# Patient Record
Sex: MALE | Race: WHITE | NOT HISPANIC OR LATINO | URBAN - METROPOLITAN AREA
[De-identification: names, ages, dates, MRNs, and addresses within clinical notes are randomized per-mention and may not be internally consistent; named-entity substitution may affect disease eponyms.]

---

## 2021-11-01 ENCOUNTER — OUT OF OFFICE VISIT (OUTPATIENT)
Dept: URBAN - METROPOLITAN AREA MEDICAL CENTER 35 | Facility: MEDICAL CENTER | Age: 76
End: 2021-11-01
Payer: MEDICARE

## 2021-11-01 DIAGNOSIS — I48.91 A-FIB: ICD-10-CM

## 2021-11-01 DIAGNOSIS — D64.89 ANEMIA DUE TO OTHER CAUSE: ICD-10-CM

## 2021-11-01 DIAGNOSIS — N17.9 ACUTE INJURY OF KIDNEY: ICD-10-CM

## 2021-11-01 DIAGNOSIS — R11.0 NAUSEA: ICD-10-CM

## 2021-11-01 PROCEDURE — 99233 SBSQ HOSP IP/OBS HIGH 50: CPT | Performed by: INTERNAL MEDICINE

## 2021-11-02 ENCOUNTER — OUT OF OFFICE VISIT (OUTPATIENT)
Dept: URBAN - METROPOLITAN AREA MEDICAL CENTER 35 | Facility: MEDICAL CENTER | Age: 76
End: 2021-11-02
Payer: MEDICARE

## 2021-11-02 DIAGNOSIS — N17.9 ACUTE INJURY OF KIDNEY: ICD-10-CM

## 2021-11-02 DIAGNOSIS — D64.89 ANEMIA DUE TO OTHER CAUSE: ICD-10-CM

## 2021-11-02 DIAGNOSIS — I48.91 A-FIB: ICD-10-CM

## 2021-11-02 DIAGNOSIS — R11.0 NAUSEA: ICD-10-CM

## 2021-11-02 PROCEDURE — 99233 SBSQ HOSP IP/OBS HIGH 50: CPT | Performed by: INTERNAL MEDICINE

## 2023-04-10 ENCOUNTER — DASHBOARD ENCOUNTERS (OUTPATIENT)
Age: 78
End: 2023-04-10

## 2023-04-11 ENCOUNTER — OFFICE VISIT (OUTPATIENT)
Dept: URBAN - METROPOLITAN AREA CLINIC 44 | Facility: CLINIC | Age: 78
End: 2023-04-11
Payer: MEDICARE

## 2023-04-11 VITALS
HEIGHT: 70 IN | DIASTOLIC BLOOD PRESSURE: 62 MMHG | WEIGHT: 165 LBS | TEMPERATURE: 97.9 F | HEART RATE: 83 BPM | SYSTOLIC BLOOD PRESSURE: 94 MMHG | BODY MASS INDEX: 23.62 KG/M2

## 2023-04-11 DIAGNOSIS — R11.0 NAUSEA: ICD-10-CM

## 2023-04-11 DIAGNOSIS — R62.7 FAILURE TO THRIVE IN ADULT: ICD-10-CM

## 2023-04-11 PROBLEM — 129588001: Status: ACTIVE | Noted: 2023-04-11

## 2023-04-11 PROCEDURE — 99204 OFFICE O/P NEW MOD 45 MIN: CPT | Performed by: INTERNAL MEDICINE

## 2023-04-11 RX ORDER — ALLOPURINOL 100 MG/1
1 TABLET TABLET ORAL ONCE A DAY
Qty: 90 TABLET | Status: ACTIVE | COMMUNITY

## 2023-04-11 RX ORDER — PANTOPRAZOLE 40 MG/1
1 TABLET TABLET, DELAYED RELEASE ORAL ONCE A DAY
Qty: 90 TABLET | Status: ACTIVE | COMMUNITY

## 2023-04-11 RX ORDER — AMIODARONE HYDROCHLORIDE 200 MG/1
1 TABLET TABLET ORAL ONCE A DAY
Qty: 90 TABLET | Refills: 1 | Status: ACTIVE | COMMUNITY

## 2023-04-11 RX ORDER — FUROSEMIDE 40 MG/1
1 TABLET TABLET ORAL ONCE A DAY
Qty: 30 TABLET | Status: ACTIVE | COMMUNITY

## 2023-04-11 RX ORDER — METOPROLOL TARTRATE 50 MG/1
1 TABLET WITH FOOD TABLET, FILM COATED ORAL TWICE A DAY
Qty: 180 TABLET | Status: ACTIVE | COMMUNITY

## 2023-04-11 RX ORDER — BUMETANIDE 2 MG/1
1 TABLET TABLET ORAL ONCE A DAY
Qty: 30 TABLET | Refills: 0 | Status: ACTIVE | COMMUNITY

## 2023-04-11 NOTE — HPI-TODAY'S VISIT:
78 yo M presents for evaluation of chronic dyspepsia. The patient associates gagging while eating, and nausea. Onset began over the last 2 months. The patient mentions that he is only able to eat a child's size breakfast and then will not eat anything further until late afternoon. He has a history of a colostomy with an indwelling permanent catheter s/p wound vac, paraplegic and history of an aortic dissection followed by Dr. 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 Labs from [place] one month ago revealed Na 129, COS18, CR 3.10, Alb 2.2, AST 74, UA with UTI.  He spend two days as an inpatient and was followed by urology. He had a submucscular fistulotomy with transanal excision anal mass on 3/28/22.

## 2024-08-13 NOTE — PHYSICAL EXAM PSYCH:
Pre-op faxed to [facility] [phone] on 8/13/24 at 2:55pm.    normal mood with appropriate affect

## 2024-09-07 ENCOUNTER — CLAIMS CREATED FROM THE CLAIM WINDOW (OUTPATIENT)
Dept: URBAN - METROPOLITAN AREA MEDICAL CENTER 35 | Facility: MEDICAL CENTER | Age: 79
End: 2024-09-07
Payer: MEDICARE

## 2024-09-07 DIAGNOSIS — D64.89 ANEMIA DUE TO OTHER CAUSE: ICD-10-CM

## 2024-09-07 PROCEDURE — 99254 IP/OBS CNSLTJ NEW/EST MOD 60: CPT | Performed by: INTERNAL MEDICINE

## 2024-09-07 PROCEDURE — 99222 1ST HOSP IP/OBS MODERATE 55: CPT | Performed by: INTERNAL MEDICINE

## 2024-09-08 ENCOUNTER — CLAIMS CREATED FROM THE CLAIM WINDOW (OUTPATIENT)
Dept: URBAN - METROPOLITAN AREA MEDICAL CENTER 35 | Facility: MEDICAL CENTER | Age: 79
End: 2024-09-08
Payer: MEDICARE

## 2024-09-08 DIAGNOSIS — D64.89 ANEMIA DUE TO OTHER CAUSE: ICD-10-CM

## 2024-09-08 DIAGNOSIS — D69.6 ACQUIRED AMEGAKARYOCYTIC THROMBOCYTOPENIA: ICD-10-CM

## 2024-09-08 PROCEDURE — 99232 SBSQ HOSP IP/OBS MODERATE 35: CPT | Performed by: INTERNAL MEDICINE

## 2024-12-13 ENCOUNTER — CLAIMS CREATED FROM THE CLAIM WINDOW (OUTPATIENT)
Dept: URBAN - METROPOLITAN AREA MEDICAL CENTER 35 | Facility: MEDICAL CENTER | Age: 79
End: 2024-12-13
Payer: MEDICARE

## 2024-12-13 DIAGNOSIS — R31.9 HEMATURIA, UNSPECIFIED TYPE: ICD-10-CM

## 2024-12-13 DIAGNOSIS — D61.818 PANCYTOPENIA: ICD-10-CM

## 2024-12-13 DIAGNOSIS — K92.0 HEMATEMESIS: ICD-10-CM

## 2024-12-13 PROCEDURE — 99221 1ST HOSP IP/OBS SF/LOW 40: CPT | Performed by: INTERNAL MEDICINE

## 2024-12-13 PROCEDURE — 99253 IP/OBS CNSLTJ NEW/EST LOW 45: CPT | Performed by: INTERNAL MEDICINE

## 2024-12-14 ENCOUNTER — CLAIMS CREATED FROM THE CLAIM WINDOW (OUTPATIENT)
Dept: URBAN - METROPOLITAN AREA MEDICAL CENTER 35 | Facility: MEDICAL CENTER | Age: 79
End: 2024-12-14
Payer: MEDICARE

## 2024-12-14 DIAGNOSIS — Z93.3 CECOSTOMY STATUS: ICD-10-CM

## 2024-12-14 DIAGNOSIS — D69.6 ACQUIRED AMEGAKARYOCYTIC THROMBOCYTOPENIA: ICD-10-CM

## 2024-12-14 DIAGNOSIS — K92.0 HEMATEMESIS: ICD-10-CM

## 2024-12-14 DIAGNOSIS — D68.8 AFIBRINOGENEMIA: ICD-10-CM

## 2024-12-14 PROCEDURE — 99232 SBSQ HOSP IP/OBS MODERATE 35: CPT | Performed by: INTERNAL MEDICINE
